# Patient Record
Sex: MALE | Race: WHITE | Employment: UNEMPLOYED | URBAN - METROPOLITAN AREA
[De-identification: names, ages, dates, MRNs, and addresses within clinical notes are randomized per-mention and may not be internally consistent; named-entity substitution may affect disease eponyms.]

---

## 2021-03-27 ENCOUNTER — HOSPITAL ENCOUNTER (OUTPATIENT)
Dept: ULTRASOUND IMAGING | Age: 36
Discharge: HOME | End: 2021-03-27
Payer: MEDICARE

## 2021-03-27 DIAGNOSIS — R10.12 ABDOMINAL PAIN, LEFT UPPER QUADRANT: ICD-10-CM

## 2021-03-27 PROCEDURE — 76705 ECHO EXAM OF ABDOMEN: CPT

## 2021-07-19 ENCOUNTER — HOSPITAL ENCOUNTER (OUTPATIENT)
Dept: WOUND CARE | Age: 36
Discharge: HOME | End: 2021-07-19
Payer: MEDICARE

## 2021-07-19 VITALS
HEART RATE: 96 BPM | BODY MASS INDEX: 27.19 KG/M2 | HEIGHT: 65 IN | SYSTOLIC BLOOD PRESSURE: 115 MMHG | RESPIRATION RATE: 18 BRPM | DIASTOLIC BLOOD PRESSURE: 82 MMHG | TEMPERATURE: 97.5 F | WEIGHT: 163.2 LBS

## 2021-07-19 PROCEDURE — 99213 OFFICE O/P EST LOW 20 MIN: CPT

## 2021-07-19 RX ORDER — LEVOTHYROXINE SODIUM 0.05 MG/1
50 TABLET ORAL DAILY
COMMUNITY

## 2021-07-19 RX ORDER — QUETIAPINE 150 MG/1
150 TABLET, FILM COATED, EXTENDED RELEASE ORAL NIGHTLY
COMMUNITY

## 2021-07-19 RX ORDER — LIDOCAINE 40 MG/G
CREAM TOPICAL PRN
Status: DISCONTINUED | OUTPATIENT
Start: 2021-07-19 | End: 2021-07-20 | Stop reason: HOSPADM

## 2021-07-19 ASSESSMENT — PAIN SCALES - GENERAL: PAINLEVEL_OUTOF10: 3

## 2021-07-19 ASSESSMENT — PAIN DESCRIPTION - ORIENTATION: ORIENTATION: RIGHT

## 2021-07-19 ASSESSMENT — PAIN DESCRIPTION - DIRECTION: RADIATING_TOWARDS: DENIES

## 2021-07-19 ASSESSMENT — PAIN DESCRIPTION - ONSET: ONSET: ON-GOING

## 2021-07-19 ASSESSMENT — PAIN DESCRIPTION - DESCRIPTORS: DESCRIPTORS: PATIENT UNABLE TO DESCRIBE;ACHING

## 2021-07-19 ASSESSMENT — PAIN DESCRIPTION - LOCATION: LOCATION: LEG

## 2021-07-19 ASSESSMENT — PAIN DESCRIPTION - FREQUENCY: FREQUENCY: INTERMITTENT

## 2021-07-19 ASSESSMENT — PAIN DESCRIPTION - PAIN TYPE: TYPE: ACUTE PAIN

## 2021-07-19 ASSESSMENT — PAIN - FUNCTIONAL ASSESSMENT: PAIN_FUNCTIONAL_ASSESSMENT: PREVENTS OR INTERFERES SOME ACTIVE ACTIVITIES AND ADLS

## 2021-07-19 ASSESSMENT — PAIN DESCRIPTION - PROGRESSION: CLINICAL_PROGRESSION: NOT CHANGED

## 2021-07-19 NOTE — PLAN OF CARE
Pt. New to [address] today for traumatic wound on right lower leg. Pt. Previously seen at urgent care for treatment prior to visit today. Wound stable, no debridement today. Will apply antibiotic ointment, cover with dry dressing, change once daily. Pt. To cont. With antibiotics as previously ordered at urgent care. F/u in [address] in 1 week as ordered, pt. Aware to call sooner with any changes or questions/concerns. Discharge instructions reviewed with patient, all questions answered, copy given to patient. Dressings were applied to all wounds per M.D. Instructions at this visit.

## 2021-07-19 NOTE — PROGRESS NOTES
[facility] Progress Note      Zandra Mane     : 1985    DATE OF VISIT:  2021    Subjective:     Dayne Angel is a 39 y.o. male who has a chief complaint of a traumatic ulcer located on the right leg. Hit his leg with machete and was seen in ER. States redness on the wound site is improving. Had steristrips applied at ER. Mr. Antonio Corral has a past medical history of Depression, Thyroid disease, and Vitamin D deficiency. He has a past surgical history that includes hernia repair and [place] tooth extraction. His family history includes Asthma in his mother; High Blood Pressure in his mother; Hypothyroidism in his mother; No Known Problems in his father. Mr. Antonio Corral reports that he has been smoking. He has been smoking about 0.25 packs per day. He has quit using smokeless tobacco. He reports current alcohol use. He reports current drug use. Drug: Marijuana. His current medication list consists of NONFORMULARY, QUEtiapine, ibuprofen, and levothyroxine. Allergies: Patient has no known allergies. Pertinent items from the review of systems are discussed in the HPI; the remainder of the ROS was reviewed and is negative.        Objective:     /82   Pulse 96   Temp 97.5 °F (36.4 °C) (Oral)   Resp 18   Ht 5' 5\" (1.651 m)   Wt 163 lb 3.2 oz (74 kg)   BMI 27.16 kg/m²   General Appearance: alert and oriented to person, place and time, well developed and well- nourished, in no acute distress  Skin: warm and dry, no rash or erythema  Head: normocephalic and atraumatic  Eyes: pupils equal, round, and reactive to light, extraocular eye movements intact, conjunctivae normal  ENT: tympanic membrane, external ear and ear canal normal bilaterally, nose without deformity, nasal mucosa and turbinates normal without polyps  Neck: supple and non-tender without mass, no thyromegaly or thyroid nodules, no cervical lymphadenopathy  Pulmonary/Chest: clear to auscultation bilaterally- no wheezes, rales or rhonchi, normal air movement, no respiratory distress  Cardiovascular: normal rate, regular rhythm, normal S1 and S2, no murmurs, rubs, clicks, or gallops, distal pulses intact, no carotid bruits  Abdomen: soft, non-tender, non-distended, normal bowel sounds, no masses or organomegaly. Dorsalis pedis pulse left palpable  Posterior tibial pulse left palpable  Dorsalis pedis pulse right palpable  Posterior tibial pulse right palpable      Ulcer on the anterior aspect right lower leg just medial to the tibia with mild fibrotic tissue, red granulation tissue, mild serous drainage, no hyperkeratotic rim, no undermining, no tunneling, no purulence, no malodor, no eschar, no periwound maceration, mild periwound erythema, mild edema, no crepitus, no increase in skin temperature, ulcer probes to soft tissue only    Today's ulcer measurements are in the wound documentation flowsheet. Wound measurements:  Wound 07/19/21 #1, Right Shin, Trauma, Full Thickness, Onset 6/28/21-Wound Length (cm): 3.1 cm    Wound 07/19/21 #1, Right Shin, Trauma, Full Thickness, Onset 6/28/21-Wound Width (cm): 0.4 cm    Wound 07/19/21 #1, Right Shin, Trauma, Full Thickness, Onset 6/28/21-Wound Depth (cm): 0.2 cm    LABS  No results found for: LABA1C      Assessment:     There is no problem list on file for this patient. Assessment of today's active condition(s): traumatic ulcer right lower leg. Factors contributing to occurrence and/or persistence of the chronic ulcer include smoking. Sharp debridement is not indicated today, based upon the exam findings in the ulcer(s) above. Discharge plan:     Treatment in the wound care center today: Wound 07/19/21 #1, Right Shin, Trauma, Full Thickness, Onset 6/28/21-Dressing/Treatment: Other (comment) (polysporin, large bandaid ). Home treatment: good handwashing before and after any dressing changes.  Cleanse ulcer with saline or soap & water before dressing change. May use Vaseline (petrolatum), Aquaphor, Aveeno, CeraVe, Cetaphil, Eucerin, Lubriderm, etc for dry skin. Dressing type for home: PSO and dry dressing daily. Written discharge instructions given to patient. Offload ulcer(s) as directed. Elevate leg(s) as directed. Follow up in 1 week.              Electronically signed by Ciara Roche DPM on 7/19/2021 at 2:30 PM.

## 2021-07-26 ENCOUNTER — HOSPITAL ENCOUNTER (OUTPATIENT)
Dept: WOUND CARE | Age: 36
Discharge: HOME | End: 2021-07-26
Payer: MEDICARE

## 2021-07-26 VITALS
SYSTOLIC BLOOD PRESSURE: 139 MMHG | DIASTOLIC BLOOD PRESSURE: 89 MMHG | TEMPERATURE: 98 F | HEART RATE: 120 BPM | HEIGHT: 65 IN | WEIGHT: 160 LBS | RESPIRATION RATE: 18 BRPM | BODY MASS INDEX: 26.66 KG/M2

## 2021-07-26 PROCEDURE — 99212 OFFICE O/P EST SF 10 MIN: CPT

## 2021-07-26 ASSESSMENT — PAIN SCALES - GENERAL: PAINLEVEL_OUTOF10: 0

## 2021-07-26 NOTE — PROGRESS NOTES
[facility] Progress Note      Clarence Mane     : 1985    DATE OF VISIT:  2021    Subjective:     Clemente Robbins is a 39 y.o. male who has a chief complaint of a traumatic ulcer located on the right leg. No pain at wound site. Mr. Yanira Sanderson has a past medical history of Depression, Thyroid disease, and Vitamin D deficiency. He has a past surgical history that includes hernia repair and [place] tooth extraction. His family history includes Asthma in his mother; High Blood Pressure in his mother; Hypothyroidism in his mother; No Known Problems in his father. Mr. Yanira Sanderson reports that he has been smoking. He has been smoking about 0.25 packs per day. He has quit using smokeless tobacco. He reports current alcohol use. He reports current drug use. Drug: Marijuana. His current medication list consists of QUEtiapine, ibuprofen, and levothyroxine. Allergies: Patient has no known allergies. Pertinent items from the review of systems are discussed in the HPI; the remainder of the ROS was reviewed and is negative.        Objective:     /89   Pulse 120   Temp 98 °F (36.7 °C) (Oral)   Resp 18   Ht 5' 5\" (1.651 m)   Wt 160 lb (72.6 kg)   BMI 26.63 kg/m²   General Appearance: alert and oriented to person, place and time, well developed and well- nourished, in no acute distress  Skin: warm and dry, no rash or erythema  Head: normocephalic and atraumatic  Eyes: pupils equal, round, and reactive to light, extraocular eye movements intact, conjunctivae normal  ENT: tympanic membrane, external ear and ear canal normal bilaterally, nose without deformity, nasal mucosa and turbinates normal without polyps  Neck: supple and non-tender without mass, no thyromegaly or thyroid nodules, no cervical lymphadenopathy  Pulmonary/Chest: clear to auscultation bilaterally- no wheezes, rales or rhonchi, normal air movement, no respiratory distress  Cardiovascular: normal rate, regular rhythm, normal S1 and S2, no murmurs, rubs, clicks, or gallops, distal pulses intact, no carotid bruits  Abdomen: soft, non-tender, non-distended, normal bowel sounds, no masses or organomegaly. Dorsalis pedis pulse left palpable  Posterior tibial pulse left palpable  Dorsalis pedis pulse right palpable  Posterior tibial pulse right palpable      Ulcer on the anterior aspect right lower leg just medial to the tibia epithelialized. Today's ulcer measurements are in the wound documentation flowsheet. Wound measurements:  [REMOVED] Wound 07/19/21 #1, Right Shin, Trauma, Full Thickness, Onset 6/28/21-Wound Length (cm): 0 cm    [REMOVED] Wound 07/19/21 #1, Right Shin, Trauma, Full Thickness, Onset 6/28/21-Wound Width (cm): 0 cm    [REMOVED] Wound 07/19/21 #1, Right Shin, Trauma, Full Thickness, Onset 6/28/21-Wound Depth (cm): 0 cm    LABS  No results found for: LABA1C      Assessment:     There is no problem list on file for this patient. Assessment of today's active condition(s): traumatic ulcer right lower leg. Factors contributing to occurrence and/or persistence of the chronic ulcer include smoking. Sharp debridement is not indicated today, based upon the exam findings in the ulcer(s) above. Discharge plan:     Treatment in the wound care center today: [REMOVED] Wound 07/19/21 #1, Right Shin, Trauma, Full Thickness, Onset 6/28/21-Dressing/Treatment: Other (comment) (mepilex border). Home treatment: good handwashing before and after any dressing changes. Cleanse ulcer with saline or soap & water before dressing change. May use Vaseline (petrolatum), Aquaphor, Aveeno, CeraVe, Cetaphil, Eucerin, Lubriderm, etc for dry skin. Dressing type for home:    Written discharge instructions given to patient. Offload ulcer(s) as directed. Elevate leg(s) as directed. Follow up in [facility] as needed.              Electronically signed by Mari Ocasio DPM on 7/26/2021 at 3:59 PM.

## 2023-08-11 ENCOUNTER — HOSPITAL ENCOUNTER (EMERGENCY)
Age: 38
Discharge: HOME | End: 2023-08-12
Payer: MEDICAID

## 2023-08-11 DIAGNOSIS — S39.012A BACK STRAIN, INITIAL ENCOUNTER: Primary | ICD-10-CM

## 2023-08-11 PROCEDURE — 99283 EMERGENCY DEPT VISIT LOW MDM: CPT

## 2023-08-11 RX ORDER — AMOXICILLIN 875 MG/1
875 TABLET, COATED ORAL 2 TIMES DAILY
COMMUNITY

## 2023-08-11 ASSESSMENT — PAIN DESCRIPTION - LOCATION: LOCATION: BACK

## 2023-08-11 ASSESSMENT — PAIN SCALES - GENERAL: PAINLEVEL_OUTOF10: 6

## 2023-08-11 ASSESSMENT — PAIN DESCRIPTION - ORIENTATION: ORIENTATION: MID

## 2023-08-11 ASSESSMENT — PAIN - FUNCTIONAL ASSESSMENT: PAIN_FUNCTIONAL_ASSESSMENT: NONE - DENIES PAIN

## 2023-08-11 ASSESSMENT — PAIN DESCRIPTION - DESCRIPTORS: DESCRIPTORS: STABBING

## 2023-08-11 ASSESSMENT — PAIN DESCRIPTION - PAIN TYPE: TYPE: ACUTE PAIN

## 2023-08-12 VITALS
HEIGHT: 66 IN | DIASTOLIC BLOOD PRESSURE: 98 MMHG | BODY MASS INDEX: 27.8 KG/M2 | TEMPERATURE: 98.2 F | OXYGEN SATURATION: 97 % | RESPIRATION RATE: 15 BRPM | WEIGHT: 173 LBS | SYSTOLIC BLOOD PRESSURE: 128 MMHG | HEART RATE: 92 BPM

## 2023-08-12 PROCEDURE — 6370000000 HC RX 637 (ALT 250 FOR IP): Performed by: NURSE PRACTITIONER

## 2023-08-12 RX ORDER — LIDOCAINE 4 G/G
1 PATCH TOPICAL ONCE
Status: DISCONTINUED | OUTPATIENT
Start: 2023-08-12 | End: 2023-08-12 | Stop reason: HOSPADM

## 2023-08-12 RX ORDER — CYCLOBENZAPRINE HCL 10 MG
10 TABLET ORAL 3 TIMES DAILY PRN
Qty: 21 TABLET | Refills: 0 | Status: SHIPPED | OUTPATIENT
Start: 2023-08-12 | End: 2023-08-22

## 2023-08-12 RX ORDER — IBUPROFEN 800 MG/1
800 TABLET ORAL EVERY 8 HOURS PRN
Qty: 20 TABLET | Refills: 0 | Status: SHIPPED | OUTPATIENT
Start: 2023-08-12

## 2023-08-12 RX ORDER — IBUPROFEN 800 MG/1
800 TABLET ORAL ONCE
Status: COMPLETED | OUTPATIENT
Start: 2023-08-12 | End: 2023-08-12

## 2023-08-12 RX ADMIN — IBUPROFEN 800 MG: 800 TABLET, FILM COATED ORAL at 00:32

## 2023-08-12 ASSESSMENT — PAIN SCALES - GENERAL: PAINLEVEL_OUTOF10: 6

## 2023-08-12 NOTE — ED NOTES
Eh Iniguez for d/c. Stable and ambulatory. No questions at d/c. Left w/ all personal belongings.       Zoraida Frost RN  08/12/23 1052

## 2023-08-12 NOTE — ED PROVIDER NOTES
[address] [place] [address]  ED  EMERGENCY DEPARTMENT ENCOUNTER        Pt Name: Janel uFng  MRN: [id number]  [facility], [place], operated by [facility] 1985  Date of evaluation: 8/11/2023  Provider: SCOTT Melara CNP  PCP: SCOTT Lance CNP  Note Started: 12:21 AM EDT 8/12/23    Evaluated by JOVANI. I have evaluated this patient. My supervising physician was available for consultation. CHIEF COMPLAINT       Chief Complaint   Patient presents with    Back Pain     Was working today and started having a sharp pain in the spine. States mid spine 6/10 stabbing pain. States he just stated a new antibiotic for strep and was worried it may have caused a reaction. Denies injury to the back. Denies SOB. HISTORY OF PRESENT ILLNESS: 1 or more Elements     History From: Patient  Limitations to history : None    Janel Fung is a 43 y.o. male who presents to ER with back pain. He was at work and doing his job and out of nowhere he had excruciating pain in his spine. He was sent home, he was given 800 mg of ibuprofen, thinks it is wearing off now. It was better to sit but still hurt. The pain was in the middle around the shoulder blades. He started a antibiotic day before yesterday for strep and he was concerned he was having a reaction. It hurts to move, stand, walk. He had no fall or injury to the back. Denies numbness or tingling into the extremities. Did say he was coughing a lot due to the strep just before the pain started. He denies numbness or tingling into his arms or legs. He denies abdominal pain. No reports of bowel or bladder incontinence. Denies saddle anesthesia. Nursing Notes were all reviewed and agreed with or any disagreements were addressed in the HPI. REVIEW OF SYSTEMS :      Review of Systems    Positives and Pertinent negatives as per HPI.      SURGICAL HISTORY     Past Surgical History:   Procedure Laterality Date    HERNIA REPAIR      [place] TOOTH EXTRACTION         CURRENTMEDICATIONS completed with a voice recognition program.  Efforts were made to edit the dictations but occasionally words are mis-transcribed.)    SCOTT Velez CNP (electronically signed)        SCOTT Velez CNP  08/12/23 0200

## 2024-04-13 ENCOUNTER — HOSPITAL ENCOUNTER (EMERGENCY)
Age: 39
Discharge: HOME | End: 2024-04-14
Attending: EMERGENCY MEDICINE
Payer: MEDICAID

## 2024-04-13 DIAGNOSIS — K60.2 ANAL FISSURE: Primary | ICD-10-CM

## 2024-04-13 DIAGNOSIS — Z59.41 FOOD INSECURITY: ICD-10-CM

## 2024-04-13 PROCEDURE — 99282 EMERGENCY DEPT VISIT SF MDM: CPT

## 2024-04-13 SDOH — ECONOMIC STABILITY - FOOD INSECURITY: FOOD INSECURITY: Z59.41

## 2024-04-14 VITALS
WEIGHT: 173 LBS | HEART RATE: 87 BPM | HEIGHT: 66 IN | RESPIRATION RATE: 19 BRPM | BODY MASS INDEX: 27.8 KG/M2 | OXYGEN SATURATION: 97 % | TEMPERATURE: 98.3 F | SYSTOLIC BLOOD PRESSURE: 126 MMHG | DIASTOLIC BLOOD PRESSURE: 94 MMHG

## 2024-04-14 ASSESSMENT — PAIN - FUNCTIONAL ASSESSMENT: PAIN_FUNCTIONAL_ASSESSMENT: 0-10

## 2024-04-14 ASSESSMENT — PAIN SCALES - GENERAL: PAINLEVEL_OUTOF10: 0

## 2024-04-14 NOTE — ED PROVIDER NOTES
EMERGENCY DEPARTMENT ENCOUNTER     [facility]  ED     Pt Name: Woo Mane   MRN: [id number]   Birthdate 1985   Date of evaluation: 4/13/2024   Provider: Gayle Hunter MD   PCP: Rubi Deleon APRN - CNP   Note Started: 12:41 AM EDT 4/14/24     CHIEF COMPLAINT     Chief Complaint   Patient presents with    Abdominal Pain     Pt states has been constipated for about a week and was having some abdominal discomfort tonight. After having a BM noticed some blood on toilet paper and thinks it is a cut. Has had hemorrhoids in the past         HISTORY OF PRESENT ILLNESS:          Woo Mane is a 38 y.o. male who presents with a chief complaint of rectal bleeding. Noticed about 9pm. He was straining to defecate. He has mild left sided abdominal pin. He did not see any blood in the bowl. No nausea or vomiting. He has eaten only 500 calories because of food instability. Zip [id number].     Nursing Notes were all reviewed and agreed with or any disagreements were addressed in the HPI.     ROS: Positives and Pertinent negatives as per HPI.    PAST MEDICAL HISTORY     Past medical history:  has a past medical history of Depression, Thyroid disease, and Vitamin D deficiency.    Past surgical history:  has a past surgical history that includes hernia repair and [place] tooth extraction.      PHYSICAL EXAM:  ED Triage Vitals   BP Temp Temp Source Pulse Respirations SpO2 Height Weight - Scale   04/13/24 2356 04/13/24 2351 04/13/24 2351 04/13/24 2351 04/13/24 2356 04/13/24 2356 04/14/24 0025 04/14/24 0025   (!) 139/103 98.3 °F (36.8 °C) Oral 87 19 97 % 1.676 m (5' 6\") 78.5 kg (173 lb)        Physical Exam  Vitals and nursing note reviewed. Exam conducted with a chaperone present.   Constitutional:       Appearance: Normal appearance. He is well-developed. He is not ill-appearing.   HENT:      Head: Normocephalic and atraumatic.      Right Ear: External ear normal.      Left Ear: External ear normal.

## 2024-04-14 NOTE — ED NOTES
Patient given information on different food pantries in the area and patient given food and other non perishables.